# Patient Record
Sex: FEMALE | Race: WHITE
[De-identification: names, ages, dates, MRNs, and addresses within clinical notes are randomized per-mention and may not be internally consistent; named-entity substitution may affect disease eponyms.]

---

## 2017-07-30 NOTE — EDM.PDOC
ED HPI GENERAL MEDICAL PROBLEM





- General


Chief Complaint: Skin Complaint


Stated Complaint: PAIN/SORE BACK


Time Seen by Provider: 07/30/17 19:32





- History of Present Illness


INITIAL COMMENTS - FREE TEXT/NARRATIVE: 





HISTORY AND PHYSICAL:





History of present illness:


The patient is a 52-year-old female who follows at [facility] and presents 

with a history of having a like area of hardness on her back since the early 

2000 but over the last 1 week and has increased in size redness and discomfort. 

Patient states she had a bite there and it never seemed to quite heal and that 

was always a hardened area there but she has never had problems with it until 

the last 1 week. She went to [facility] and was seen there and given a 

prescription For Keflex which she did not want to fill because she felt that 

the area needs to be incised. She states that his increase in redness and size 

and she is worried. She has no systemic complaints of fever chills nausea 

vomiting or neurosensory changes and she has not noticed any drainage from the 

area. The patient tells me that she does not want to take the antibiotics 

because she feels it needs to be drained





Review of systems: 


As per history of present illness and below otherwise all systems reviewed and 

negative.





Past medical history: 


As per history of present illness and as reviewed below otherwise 

noncontributory.





Surgical history: 


As per history of present illness and as reviewed below otherwise 

noncontributory.





Social history: 


No reported history of drug or alcohol abuse.





Family history: 


As per history of present illness and as reviewed below otherwise 

noncontributory.





Physical exam:


General: Well-developed well-nourished female who is mildly overweight but 

nontoxic 


HEENT: Atraumatic, normocephalic, negative for conjunctival pallor or scleral 

icterus, mucous membranes moist, throat clear, neck supple, nontender, trachea 

midline.


Lungs: Clear to auscultation, breath sounds equal bilaterally, chest nontender.


Heart: S1S2, regular rate and rhythm no overt murmurs 


Abdomen: Soft, nondistended, nontender. NABS


Pelvis: Stable nontender.


Genitourinary: Deferred.


Rectal: Deferred.


Extremities: Atraumatic, negative for cords or calf pain. Neurovascular 

unremarkable.


Neuro: Awake, alert, oriented. Cranial nerves II through XII unremarkable. 

Cerebellum unremarkable. Motor and sensory unremarkable throughout. Exam 

nonfocal.


Skin: At the mid thoracic back to the left of the midline there is a 10 x 7 cm 

area of ill-defined erythema with a central area of induration and a small pore-

like punctum seen at its center. There is no fluctuance but there is tenderness 

on palpation. The margins are not very well demarcated for the area of 

induration. There is no drainage appreciated. Remainder of the back exam and 

skin exam is within normal limits 


Diagnostics:


CBC soft tissue ultrasound





Therapeutics:


Norco





The patient is been made aware of the delays with radiology reading her 

ultrasound and that I will address the results as soon as I receive them.





Patient is aware of small size of the fluid collection and due to the small 

nature of this we have decided not to try to IND it as I am not really sure of 

the depth of this and she has an appointment for evaluation for permanent 

excision this week. I will ask her to not to fill her prescription for Keflex 

that she was given from the clinic and I will prescribe her clindamycin. I will 

also give her something for pain management to take at home. She is comfortable 

with this clear plan





Impression: 


Back cyst/infected abscess/cellulitis





Definitive disposition and diagnosis as appropriate pending reevaluation and 

review of above.


  ** lump on lower back


Pain Score (Numeric/FACES): 9





- Related Data


 Allergies











Allergy/AdvReac Type Severity Reaction Status Date / Time


 


codeine Allergy  Vomiting Verified 07/30/17 19:34


 


Penicillins Allergy  Hives Verified 07/30/17 19:34


 


Sulfa (Sulfonamide Allergy  Rash Verified 07/30/17 19:34





Antibiotics)     


 


antibiotics Allergy  Other Uncoded 07/30/17 19:34











Home Meds: 


 Home Meds





. [No Known Home Meds]  07/30/17 [History]











ED ROS GENERAL





- Review of Systems


Review Of Systems: ROS reveals no pertinent complaints other than HPI.





ED EXAM, SKIN/RASH


Exam: See Below (See dictation)





Course





- Vital Signs


Last Recorded V/S: 


 Last Vital Signs











Temp  37.1 C   07/30/17 19:34


 


Pulse  107 H  07/30/17 21:42


 


Resp  14   07/30/17 21:42


 


BP  132/79   07/30/17 21:42


 


Pulse Ox  94 L  07/30/17 21:42














- Orders/Labs/Meds


Orders: 


 Active Orders 24 hr











 Category Date Time Status


 


 Abdomen Ltd [US] Stat Exams  07/30/17 19:42 Taken











Labs: 


 Laboratory Tests











  07/30/17 Range/Units





  20:29 


 


WBC  12.30 H  (4.0-11.0)  K/uL


 


RBC  4.32  (4.30-5.90)  M/uL


 


Hgb  14.2  (12.0-16.0)  g/dL


 


Hct  42.0  (36.0-46.0)  %


 


MCV  97.2  (80.0-98.0)  fL


 


MCH  32.9 H  (27.0-32.0)  pg


 


MCHC  33.8  (31.0-37.0)  g/dL


 


RDW Std Deviation  48.9  (28.0-62.0)  fl


 


RDW Coeff of Lindsey  14  (11.0-15.0)  %


 


Plt Count  274  (150-400)  K/uL


 


MPV  10.40  (7.40-12.00)  fL


 


Neut % (Auto)  75.0  (48.0-80.0)  %


 


Lymph % (Auto)  14.7 L  (16.0-40.0)  %


 


Mono % (Auto)  8.7  (0.0-15.0)  %


 


Eos % (Auto)  1.4  (0.0-7.0)  %


 


Baso % (Auto)  0.2  (0.0-1.5)  %


 


Neut # (Auto)  9.2 H  (1.4-5.7)  K/uL


 


Lymph # (Auto)  1.8  (0.6-2.4)  K/uL


 


Mono # (Auto)  1.1 H  (0.0-0.8)  K/uL


 


Eos # (Auto)  0.2  (0.0-0.7)  K/uL


 


Baso # (Auto)  0.0  (0.0-0.1)  K/uL


 


Nucleated RBC %  0.0  /100WBC


 


Nucleated RBCs #  0  K/uL











Meds: 


Medications














Discontinued Medications














Generic Name Dose Route Start Last Admin





  Trade Name Freq  PRN Reason Stop Dose Admin


 


Hydrocodone Bitart/Acetaminophen  1 tab  07/30/17 21:28  07/30/17 21:39





  Norco 325-7.5 Mg  PO  07/30/17 21:29  1 tab





  ONETIME ONE   Administration














Departure





- Departure


Time of Disposition: 21:46


Disposition: Home, Self-Care 01


Condition: Good


Clinical Impression: 


 Infected cyst of skin





Cellulitis


Qualifiers:


 Site of cellulitis: trunk Site of cellulitis of trunk: back Qualified Code(s): 

L03.312 - Cellulitis of back [any part except buttock]








- Discharge Information


Forms:  ED Department Discharge


Additional Instructions: 


The following information is given to patients seen in the emergency department 

who are being discharged to home. This information is to outline your options 

for follow-up care. We provide all patients seen in our emergency department 

with a follow-up referral.





The need for follow-up, as well as the timing and circumstances, are variable 

depending upon the specifics of your emergency department visit.





If you don't have a primary care physician on staff, we will provide you with a 

referral. We always advise you to contact your personal physician following an 

emergency department visit to inform them of the circumstance of the visit and 

for follow-up with them and/or the need for any referrals to a consulting 

specialist.





The emergency department will also refer you to a specialist when appropriate. 

This referral assures that you have the opportunity for followup care with a 

specialist. All of these measure are taken in an effort to provide you with 

optimal care, which includes your followup.





Under all circumstances we always encourage you to contact your private 

physician who remains a resource for coordinating  your care. When calling for 

followup care, please make the office aware that this follow-up is from your 

recent emergency room visit. If for any reason you are refused follow-up, 

please contact the [facility] emergency 

department at [phone] and ask to speak to the emergency department 

charge nurse.








[facility]


Specialty Care-General Surgery


20/[address] [address] [phone]





[facility] 


Specialty clinic-Plastic Surgery and Hand Surgery


[address] [address] [phone]





Please keep your appointment as scheduled in the general surgery clinic or call 

them or our plastic surgeon in the morning to see if you can get moved up. 

Please state that your patient in the ER and they should be aware of your 

visit. Please do not fill the prescription you have for antibiotics and please 

take the clindamycin you have been given tonight and use the pain medication 

you have been given here, Norco, only as needed. Use over-the-counter 

medications otherwise. Return to ER as needed and as discussed





- My Orders


Last 24 Hours: 


My Active Orders





07/30/17 19:42


Abdomen Ltd [US] Stat 














- Assessment/Plan


Last 24 Hours: 


My Active Orders





07/30/17 19:42


Abdomen Ltd [US] Stat

## 2017-07-31 NOTE — US
EXAM DATE: 17



PATIENT'S AGE: 52





Patient: CHANTALE ENGLAND



Facility: [place], [place]

Patient ID: [id number]

Site Patient ID: [id number].

Site Accession #: [id number]

: 1965

Study: US Abdomen [id number] 8:29:42 PM

Ordering Physician: Óscar Hatfield



Final Report: 

INDICATION: EVAL ABSCESS ON LT LOWER BACK



HISTORY:

Evaluate abscess.



COMPARISON:

None.



TECHNIQUE:

Limited ultrasound of the lower back.



FINDINGS:

There is skin thickening and edema, with a hypoechoic mass measuring 1.5 x 1.6 
x 2.1 cm. This has mild hyperemia peripherally, but no internal blood flow. 
Mixed echogenicity on grayscale imaging. This is compatible with the clinical 
suspicion of an abscess.



IMPRESSION:

1. Hypoechoic mass at the site of the palpable abnormality, consistent with an 
abscess.

2. Clinical and imaging followup is advised to document resolution.



Dictated by Dennis López MD @ 2017 9:36:39 PM





Dictated by: Dennis López MD @ 2017 21:36:47

(Electronic Signature)



Report Signed by Proxy.
[facility]

## 2017-08-01 NOTE — EDM.PDOC
ED HPI GENERAL MEDICAL PROBLEM





- General


Chief Complaint: Skin Complaint


Stated Complaint: PAIN CYST ON LOWER BACK


Time Seen by Provider: 08/01/17 15:33


Source of Information: Reports: Patient


History Limitations: Reports: No Limitations





- History of Present Illness


INITIAL COMMENTS - FREE TEXT/NARRATIVE: 


History of present illness:


[52-year-old female returns with concerns of increasing size of cyst/abscess in 

lower back. Patient became she does have a surgical consult pending but it has 

come to ahead and is boggy and doing some scant amount of drainage. Patient she 

has been breaking fevers and globally feeling unwell.]





Review of systems: 


As per history of present illness and below otherwise all systems reviewed and 

negative.





Past medical history: 


As per history of present illness and as reviewed below otherwise 

noncontributory.





Surgical history: 


As per history of present illness and as reviewed below otherwise 

noncontributory.





Social history: 


No reported history of drug or alcohol abuse.





Family history: 


As per history of present illness and as reviewed below otherwise 

noncontributory.





Physical exam:


HEENT: Atraumatic, normocephalic, pupils reactive, negative for conjunctival 

pallor or scleral icterus, mucous membranes moist, throat clear, neck supple, 

nontender, trachea midline.


Lungs: Clear to auscultation, breath sounds equal bilaterally, chest nontender.


Heart: S1S2, regular, negative for clicks, rubs, or JVD.


Abdomen: Soft, nondistended, nontender. Negative for masses or 

hepatosplenomegaly. Negative for costovertebral tenderness.


Pelvis: Stable nontender.


Genitourinary: Deferred.


Rectal: Deferred.


Extremities: Atraumatic, negative for cords or calf pain. Neurovascular 

unremarkable.


Neuro: Awake, alert, oriented. Cranial nerves II through XII unremarkable. 

Cerebellum unremarkable. Motor and sensory unremarkable throughout. Exam 

nonfocal.


Skin: Clearly delineated area of erythema with ronan pustules over the targeted 

area with some amount of bogginess.





Lower back cleaned with Betadine approximately 3 mL of 1% lidocaine injected 

until anesthesia was appreciated. An 11 blade used to make an incision 

approximately 1 inch with a scant amount of solid purulent discharge able to be 

expressed, and subsequent serosanguineous drainage coming freely. Discontinued 

further palpation and/or milking of the abscess secondary to level of pain 

patient was experiencing an inability to tolerate more. Patient does have a 

subsequent appointment for surgical consult but this did decrease the size and 

amount of purulence.





2 g Rocephin IM








Diagnostics:


[Culture]





Therapeutics:


[I&D]





Impression/Plan: 


[Abscess, cellulitis and metabolic care continue on clindamycin follow-up with 

surgery as already scheduled]








Definitive disposition and diagnosis as appropriate pending reevaluation and 

review of above.








  ** back


Pain Score (Numeric/FACES): 9





- Related Data


 Allergies











Allergy/AdvReac Type Severity Reaction Status Date / Time


 


codeine Allergy  Vomiting Verified 08/01/17 15:16


 


erythromycin base Allergy  Vomiting Verified 08/01/17 15:17


 


Penicillins Allergy  Hives Verified 08/01/17 15:16


 


Sulfa (Sulfonamide Allergy  Rash Verified 08/01/17 15:16





Antibiotics)     











Home Meds: 


 Home Meds





Acetaminophen/HYDROcodone [Norco 325-5 MG] 1 tab PO ASDIRECTED 08/01/17 [History

]


Clindamycin HCl 300 mg PO QID 08/01/17 [History]











Past Medical History





- Past Health History


Medical/Surgical History: Denies Medical/Surgical History


HEENT History: Reports: None


Cardiovascular History: Reports: None


Respiratory History: Reports: Asthma


Gastrointestinal History: Reports: None


Genitourinary History: Reports: None


Musculoskeletal History: Reports: None


Neurological History: Reports: None


Psychiatric History: Reports: None


Endocrine/Metabolic History: Reports: Other (See Below)


Other Endocrine/Metabolic History: gland removed





- Infectious Disease History


Infectious Disease History: Reports: Chicken Pox





Social & Family History





- Family History


Family Medical History: Noncontributory





- Tobacco Use


Smoking Status *Q: Current Some Day Smoker


Years of Tobacco use: 30


Packs/Tins Daily: 1





- Recreational Drug Use


Recreational Drug Use: No





ED ROS GENERAL





- Review of Systems


Review Of Systems: See Below (See history of present illness)





ED EXAM, SKIN/RASH


Exam: See Below (See history of present illness)





Course





- Vital Signs


Last Recorded V/S: 


 Last Vital Signs











Temp  36.6 C   08/01/17 15:17


 


Pulse  91   08/01/17 15:17


 


Resp  18   08/01/17 15:17


 


BP  124/75   08/01/17 15:17


 


Pulse Ox  95   08/01/17 15:17














- Orders/Labs/Meds


Meds: 


Medications














Discontinued Medications














Generic Name Dose Route Start Last Admin





  Trade Name Freq  PRN Reason Stop Dose Admin


 


Ceftriaxone Sodium 2,000 mg/  0 mg  08/01/17 16:34  





  Lidocaine HCl 2.1 ml  IM  08/01/17 16:35  





  ONETIME ONE   


 


Lidocaine HCl  20 ml  08/01/17 15:58  08/01/17 16:09





  Xylocaine 1%  INJECT  08/01/17 15:59  20 ml





  ONETIME ONE   Administration














Departure





- Departure


Time of Disposition: 16:39


Disposition: Home, Self-Care 01


Condition: Good


Clinical Impression: 


 Infected cyst of skin





Cellulitis


Qualifiers:


 Site of cellulitis: trunk Site of cellulitis of trunk: back Qualified Code(s): 

L03.312 - Cellulitis of back [any part except buttock]








- Discharge Information


Forms:  ED Department Discharge


Additional Instructions: 


The following information is given to patients seen in the emergency department 

who are being discharged to home. This information is to outline your options 

for follow-up care. We provide all patients seen in our emergency department 

with a follow-up referral.





The need for follow-up, as well as the timing and circumstances, are variable 

depending upon the specifics of your emergency department visit.





If you don't have a primary care physician on staff, we will provide you with a 

referral. We always advise you to contact your personal physician following an 

emergency department visit to inform them of the circumstance of the visit and 

for follow-up with them and/or the need for any referrals to a consulting 

specialist.





The emergency department will also refer you to a specialist when appropriate. 

This referral assures that you have the opportunity for follow-up care with a 

specialist. All of these measure are taken in an effort to provide you with 

optimal care, which includes your follow-up.





Under all circumstances we always encourage you to contact your private 

physician who remains a resource for coordinating your care. When calling for 

follow-up care, please make the office aware that this follow-up is from your 

recent emergency room visit. If for any reason you are refused follow-up, 

please contact the [facility] Emergency 

Department at [phone] and asked to speak to the emergency department 

charge nurse.





Take clindamycin and hydrocodone as Arty prescribed








Follow-up with your surgery appointment as discussed











Return to ED as needed as discussed

## 2017-08-04 NOTE — PCM.OPNOTE
- General Post-Op/Procedure Note


Date of Surgery/Procedure: 08/04/17


Operative Procedure(s): I & D left back abscess


Pre Op Diagnosis: Left back abscess


Post-Op Diagnosis: Same


Anesthesia Technique: General LMA (ASA II)


Primary Surgeon: Familia Muñoz


Fluid Replacement, Intraop: 750


EBL in mLs: 30


Condition: Good


Free Text/Narrative:: 





Dictation [id number]

## 2017-08-04 NOTE — OR
SURGEON:

Familia Muñoz M.D.

 

DATE OF PROCEDURE:  08/04/2017

 

OPERATION PERFORMED:

Incision and drainage of left back abscess.

 

ANESTHESIA:

General LMA.

 

ASA CLASSIFICATION:

II.

 

PREOPERATIVE DIAGNOSIS:

Chronic left back abscess.

 

POSTOPERATIVE DIAGNOSIS:

Chronic left back abscess.

 

ESTIMATED BLOOD LOSS:

Approximately 30 mL.

 

INTRAOPERATIVE FLUID REPLACEMENT:

750 mL of crystalloid.

 

DESCRIPTION OF PROCEDURE:

The patient was taken to the operating room, placed on the operating table in

the supine position with a beanbag under her.  Time-out was called for

appropriate identification of the patient and procedure.  Thigh-high TEDs and

sequential compression boots had been placed.  Following satisfactory attainment

of general anesthesia with placement of an LMA, she was positioned in the right

lateral decubitus position with left side elevated.  Care was taken to pad and

protect all bony prominences.  The surgical site in the left mid back was

prepped with Betadine solution.  Sterile drapes were applied.  The skin incision

was made directly over the mass and deepened through the subcutaneous tissue.

Aerobic and anaerobic cultures were obtained.  There was minimal amount of

purulent material.  There was a fair amount of debridement tissue and what

appeared to be sebaceous cyst wall.  This was all debrided away.  The wound was

irrigated with several 100 mL of sterile saline solution.  Hemostasis was

obtained with the use of electrocautery.  Half-inch Penrose drain felicia were

placed through the incision with a total of 3 felicia placed.  These were secured

with interrupted 2-0 nylon sutures to prevent them from migrating out too soon.

Once that was accomplished, field block was carried out with 10 mL of 0.5%

Marcaine.  The wound was then dressed with fluffs and taped in place with Mefix

tape.

 

The patient was then returned to the transfer cart in the supine position.

Following emergence from anesthesia and extubation, she was taken to recovery

room in stable condition.

 

 

HORACE DAWN

DD:  08/04/2017 14:09:49

DT:  08/04/2017 14:46:03

Job #:  [id number]

YAMINI

## 2017-08-04 NOTE — PCM.POSTAN
POST ANESTHESIA ASSESSMENT





- MENTAL STATUS


Mental Status: Alert, Oriented





- RESPIRATORY


Respiratory Status: Respiratory Rate WNL, Airway Patent, O2 Saturation Stable





- CARDIOVASCULAR


CV Status: Pulse Rate WNL, Blood Pressure Stable





- GASTROINTESTINAL


GI Status: No Symptoms





- PAIN


Pain Score: 0





- POST OP HYDRATION


Hydration Status: Adequate & Stable





- OBSERVATIONS


Free Text/Narrative:: 





no anesthesia problems

## 2017-08-04 NOTE — PCM.PREANE
Preanesthetic Assessment





- Anesthesia/Transfusion/Family Hx


Anesthesia History: Prior Anesthesia Without Reaction


Family History of Anesthesia Reaction: No


Transfusion History: No Prior Transfusion(s)


Intubation History: Unknown





- Review of Systems


General: No Symptoms


Pulmonary: No Symptoms


Cardiovascular: No Symptoms


Gastrointestinal: No Symptoms


Neurological: No Symptoms


Other: Reports: None





- Physical Assessment


O2 Sat by Pulse Oximetry: 98


Respiratory Rate: 16


Vital Signs: 





 Last Vital Signs











Temp  36.7 C   08/04/17 12:38


 


Pulse  85   08/04/17 12:38


 


Resp  16   08/04/17 12:38


 


BP  143/76 H  08/04/17 12:38


 


Pulse Ox  98   08/04/17 12:38











Height: 1.65 m


Weight: 102.058 kg


ASA Class: 2


Mental Status: Alert & Oriented x3


Airway Class: Mallampati = 2


Dentition: Reports: Normal Dentition


Thyro-Mental Finger Breadths: 3


Mouth Opening Finger Breadths: 3


ROM/Head Extension: Full


Lungs: Clear to Auscultation, Normal Respiratory Effort


Cardiovascular: Regular Rate, Regular Rhythm





- Allergies


Allergies/Adverse Reactions: 


 Allergies











Allergy/AdvReac Type Severity Reaction Status Date / Time


 


codeine Allergy  Vomiting Verified 08/01/17 15:16


 


erythromycin base Allergy  Vomiting Verified 08/01/17 15:17


 


Penicillins Allergy  Hives Verified 08/01/17 15:16


 


shellfish derived Allergy  Hives Verified 08/03/17 16:26


 


Sulfa (Sulfonamide Allergy  Rash Verified 08/01/17 15:16





Antibiotics)     














- Blood


Blood Available: No





- Anesthesia Plan


Pre-Op Medication Ordered: None





- Acknowledgements


Anesthesia Type Planned: General Anesthesia


Pt an Appropriate Candidate for the Planned Anesthesia: Yes


Alternatives and Risks of Anesthesia Discussed w Pt/Guardian: Yes


Pt/Guardian Understands and Agrees with Anesthesia Plan: Yes





PreAnesthesia Questionnaire





- Past Health History


Medical/Surgical History: Denies Medical/Surgical History


HEENT History: Reports: Allergic Rhinitis


Other HEENT History: uses reading glasses


Cardiovascular History: Reports: None


Respiratory History: Reports: Asthma


Other Respiratory History: mild asthma on exertion,  no inhaler


Gastrointestinal History: Reports: GERD


Other Gastrointestinal History: takes tums


Genitourinary History: Reports: None


OB/GYN History: Reports: None


Musculoskeletal History: Reports: Fracture


Other Musculoskeletal History: hx of fx toe


Neurological History: Reports: None


Psychiatric History: Reports: None


Endocrine/Metabolic History: Reports: Obesity/BMI 30+


Other Endocrine/Metabolic History: gland removed


Hematologic History: Reports: None


Immunologic History: Reports: None


Oncologic (Cancer) History: Reports: None


Dermatologic History: Reports: None





- Infectious Disease History


Infectious Disease History: Reports: Chicken Pox





- Past Surgical History


Head Surgeries/Procedures: Reports: None


HEENT Surgical History: Reports: Oral Surgery, Other (See Below)


Other HEENT Surgeries/Procedures: excision of wisdom teeth,  removal of 

infected lymph node right neck


Female  Surgical History: Reports: Tubal Ligation





- SUBSTANCE USE


Smoking Status *Q: Former Smoker (never serious smoker)


Tobacco Use Within Last Twelve Months: Cigarettes


Days Per Week of Alcohol Use: 7


Number of Drinks Per Day: 4


Total Drinks Per Week: 28


Recreational Drug Use History: No





- HOME MEDS


Home Medications: 


 Home Meds





Clindamycin HCl 300 mg PO TID 08/01/17 [History]











- CURRENT (IN HOUSE) MEDS


Current Meds: 





 Current Medications





Lactated Ringer's (Ringers, Lactated)  1,000 mls @ 125 mls/hr IV ASDIRECTED SE

## 2018-02-19 NOTE — EDM.PDOC
ED HPI GENERAL MEDICAL PROBLEM





- General


Chief Complaint: Abdominal Pain


Stated Complaint: ABDOMINAL PAIN


Time Seen by Provider: 02/19/18 15:22


Source of Information: Reports: Patient


History Limitations: Reports: No Limitations





- History of Present Illness


INITIAL COMMENTS - FREE TEXT/NARRATIVE: 


HISTORY AND PHYSICAL:





History of present illness:


Patient is a 52-year-old female who presents to the emergency room with 

complaints of subrapubic pelvic pain which is more localized in the left lower 

quadrant. She does state it radiates up into the left upper quadrant.  

states that she has had blood mixed in her stool since yesterday. Has had 2 

small hard bowel movements over the past 3-4 days. Upon arrival she is 

diaphoretic, clammy and hunched over in her wheelchair guarding her abdomen.





Denies any fever, chills, vomiting, diarrhea. Denies any near syncopy or 

syncopy. No history of GI illnesses or past surgeries.





Review of systems: 


As per history of present illness and below otherwise all systems reviewed and 

negative.





Past medical history: 


As per history of present illness and as reviewed below otherwise 

noncontributory.





Surgical history: 


As per history of present illness and as reviewed below otherwise 

noncontributory.





Social history: 


No reported history of drug or alcohol abuse.





Family history: 


As per history of present illness and as reviewed below otherwise 

noncontributory.





Physical exam:


General: Well-developed and well-nourished 52-year-old female. Alert and 

oriented. Nontoxic appearing.


HEENT: Atraumatic, normocephalic, pupils reactive, negative for conjunctival 

pallor or scleral icterus, mucous membranes moist, throat clear, neck supple, 

nontender, trachea midline.


Lungs: Clear to auscultation, breath sounds equal bilaterally, chest nontender.


Heart: S1S2, regular, negative for clicks, rubs, or JVD.


Abdomen: Soft, nondistended, firm with tenderness to the left upper quadrant 

and left lower quadrant. Negative for masses or hepatosplenomegaly. Negative 

for costovertebral tenderness.


Pelvis: Stable nontender.


Genitourinary: Deferred.


Rectal: This was done with a chaperone at the bedside. Hemoccult is


Skin: Diaphoretic, intact, no overt lesions or rashes.


Extremities: Atraumatic, moves all extremities per self without difficulty or 

deficits, negative for cords or calf pain. Neurovascular unremarkable.


Neuro: Awake, alert, oriented. Cranial nerves II through XII unremarkable. 

Cerebellum unremarkable. Motor and sensory unremarkable throughout. Exam 

nonfocal.





Afebrile. VSS. WBC 14.46. Negative Amylase/Lipase. Negative UA.


1524 - the radiologist called with a verbal report for this patient's CT. Shows 

free air adjacent to the distal sigmoid colon/ rectum, tracking along the 

presacral space. Probable adjacent abscess formation with fecal material 

communicating with the presumed site of perforation. This information was 

shared with Dr. Mahoney, who was consulted on this case. Levaquin (due to PCN 

allergy) and Flagyl has been started.





Dr. Mahoney has come to evaluate patient. She is initiated transferred to 

[facility] and [facility] for higher level of care. Assumed care of patient at this 

time.





Diagnostics:


CBC, CMP, amylase, lipase, CT abdomen and pelvis, UA





Therapeutics:


Normal saline, Toradol, Zofran, morphine, Levaquin, Flagyl





Impression: 


Abdominal pain


Perforated colon with abscess





Plan:


Transfer to [facility] in [place] per Dr Mahoney





Definitive disposition and diagnosis as appropriate pending reevaluation and 

review of above.





Onset: Today


Duration: Minutes:


  ** Abdominal


Pain Score (Numeric/FACES): 10





- Related Data


 Allergies











Allergy/AdvReac Type Severity Reaction Status Date / Time


 


codeine Allergy  Vomiting Verified 02/19/18 15:27


 


erythromycin base Allergy  Vomiting Verified 02/19/18 15:27


 


Penicillins Allergy  Hives Verified 02/19/18 15:27


 


shellfish derived Allergy  Hives Verified 02/19/18 15:27


 


Sulfa (Sulfonamide Allergy  Rash Verified 02/19/18 15:27





Antibiotics)     











Home Meds: 


 Home Meds





. [No Known Home Meds]  02/19/18 [History]











Past Medical History





- Past Health History


Medical/Surgical History: Denies Medical/Surgical History


HEENT History: Reports: Allergic Rhinitis


Other HEENT History: uses reading glasses


Cardiovascular History: Reports: None


Respiratory History: Reports: Asthma


Other Respiratory History: mild asthma on exertion,  no inhaler


Gastrointestinal History: Reports: GERD


Other Gastrointestinal History: takes tums


Genitourinary History: Reports: None


OB/GYN History: Reports: None


Musculoskeletal History: Reports: Fracture


Other Musculoskeletal History: hx of fx toe


Neurological History: Reports: None


Psychiatric History: Reports: None


Endocrine/Metabolic History: Reports: Obesity/BMI 30+


Other Endocrine/Metabolic History: gland removed


Hematologic History: Reports: None


Immunologic History: Reports: None


Oncologic (Cancer) History: Reports: None


Dermatologic History: Reports: None





- Infectious Disease History


Infectious Disease History: Reports: Chicken Pox





- Past Surgical History


Head Surgeries/Procedures: Reports: None


HEENT Surgical History: Reports: Oral Surgery, Other (See Below)


Other HEENT Surgeries/Procedures: excision of wisdom teeth,  removal of 

infected lymph node right neck


Female  Surgical History: Reports: Tubal Ligation





Social & Family History





- Family History


Family Medical History: Noncontributory





- Tobacco Use


Smoking Status *Q: Never Smoker


Years of Tobacco use: 20


Packs/Tins Daily: 1


Used Tobacco, but Quit: Yes


Second Hand Smoke Exposure: No





- Caffeine Use


Caffeine Use: Reports: None





- Alcohol Use


Days Per Week of Alcohol Use: 7


Number of Drinks Per Day: 4


Total Drinks Per Week: 28





- Recreational Drug Use


Recreational Drug Use: No


Drug Use in Last 12 Months: No





ED ROS GENERAL





- Review of Systems


Review Of Systems: ROS reveals no pertinent complaints other than HPI.





ED EXAM, GI/ABD





- Physical Exam


Exam: See Below (See dictation)





Course





- Vital Signs


Last Recorded V/S: 


 Last Vital Signs











Temp  97.3 F   02/19/18 17:30


 


Pulse  78   02/19/18 17:30


 


Resp  18   02/19/18 17:30


 


BP  129/81   02/19/18 17:30


 


Pulse Ox  96   02/19/18 17:30














- Orders/Labs/Meds


Orders: 


 Active Orders 24 hr











 Category Date Time Status


 


 Abdomen Pelvis w Cont [CT] Stat Exams  02/19/18 15:39 Taken


 


 Levofloxacin/Dextrose 5%-Water [Levaquin in D5W 750 MG/ Med  02/19/18 17:31 

Active





 150 ML] 750 mg   





 Premix Bag 1 bag   





 IV ONETIME   


 


 metroNIDAZOLE/Normal Saline [Flagyl 500 MG in  ML Med  02/19/18 17:30 

Active





 ] 500 mg   





 Premix Bag 1 bag   





 IV ONETIME   








 Medication Orders





Metronidazole 500 mg/ Premix  100 mls @ 100 mls/hr IV ONETIME ONE


   Stop: 02/19/18 18:29


   Last Admin: 02/19/18 17:35  Dose: 100 mls/hr


Levofloxacin/Dextrose 750 mg/ (Premix)  150 mls @ 100 mls/hr IV ONETIME ONE


   Stop: 02/19/18 19:00








Labs: 


 Laboratory Tests











  02/19/18 02/19/18 02/19/18 Range/Units





  15:33 15:33 15:33 


 


WBC  14.46 H    (4.0-11.0)  K/uL


 


RBC  4.58    (4.30-5.90)  M/uL


 


Hgb  15.1    (12.0-16.0)  g/dL


 


Hct  44.2    (36.0-46.0)  %


 


MCV  96.5    (80.0-98.0)  fL


 


MCH  33.0 H    (27.0-32.0)  pg


 


MCHC  34.2    (31.0-37.0)  g/dL


 


RDW Std Deviation  48.3    (28.0-62.0)  fl


 


RDW Coeff of Lindsey  14    (11.0-15.0)  %


 


Plt Count  322    (150-400)  K/uL


 


MPV  10.30    (7.40-12.00)  fL


 


Neut % (Auto)  74.3    (48.0-80.0)  %


 


Lymph % (Auto)  17.0    (16.0-40.0)  %


 


Mono % (Auto)  6.4    (0.0-15.0)  %


 


Eos % (Auto)  1.9    (0.0-7.0)  %


 


Baso % (Auto)  0.4    (0.0-1.5)  %


 


Neut # (Auto)  10.7 H    (1.4-5.7)  K/uL


 


Lymph # (Auto)  2.5 H    (0.6-2.4)  K/uL


 


Mono # (Auto)  0.9 H    (0.0-0.8)  K/uL


 


Eos # (Auto)  0.3    (0.0-0.7)  K/uL


 


Baso # (Auto)  0.1    (0.0-0.1)  K/uL


 


Nucleated RBC %  0.0    /100WBC


 


Nucleated RBCs #  0    K/uL


 


Sodium   141   (136-146)  mmol/L


 


Potassium   4.2   (3.5-5.1)  mmol/L


 


Chloride   107   ()  mmol/L


 


Carbon Dioxide   24   (21-31)  mmol/L


 


BUN   11   (6.0-23.0)  mg/dL


 


Creatinine   1.1   (0.6-1.5)  mg/dL


 


Est Cr Clr Drug Dosing   51.66   mL/min


 


Estimated GFR (MDRD)   52.2   ml/min


 


Glucose   117 H   ()  mg/dL


 


Calcium   8.9   (8.8-10.8)  mg/dL


 


Total Bilirubin   0.3   (0.1-1.5)  mg/dL


 


AST   30   (5-40)  IU/L


 


ALT   39   (8-54)  IU/L


 


Alkaline Phosphatase   62   ()  


 


Total Protein   7.7   (6.0-8.0)  g/dL


 


Albumin   4.1   (3.5-5.0)  g/dL


 


Globulin   3.6 H   (2.0-3.5)  g/dL


 


Albumin/Globulin Ratio   1.1 L   (1.3-2.8)  


 


Amylase    62  (10-90)  U/L


 


Lipase    16  (7-80)  U/L


 


Urine Color     


 


Urine Appearance     


 


Urine pH     (5.0-8.0)  


 


Ur Specific Gravity     (1.001-1.035)  


 


Urine Protein     (NEGATIVE)  mg/dL


 


Urine Glucose (UA)     (NEGATIVE)  mg/dL


 


Urine Ketones     (NEGATIVE)  mg/dL


 


Urine Occult Blood     (NEGATIVE)  


 


Urine Nitrite     (NEGATIVE)  


 


Urine Bilirubin     (NEGATIVE)  


 


Urine Urobilinogen     (<2.0)  EU/dL


 


Ur Leukocyte Esterase     (NEGATIVE)  


 


Urine RBC     (0-2/HPF)  


 


Urine WBC     (0-5/HPF)  


 


Ur Epithelial Cells     (NONE-FEW)  


 


Urine Bacteria     (NEGATIVE)  














  02/19/18 Range/Units





  16:51 


 


WBC   (4.0-11.0)  K/uL


 


RBC   (4.30-5.90)  M/uL


 


Hgb   (12.0-16.0)  g/dL


 


Hct   (36.0-46.0)  %


 


MCV   (80.0-98.0)  fL


 


MCH   (27.0-32.0)  pg


 


MCHC   (31.0-37.0)  g/dL


 


RDW Std Deviation   (28.0-62.0)  fl


 


RDW Coeff of Lindsey   (11.0-15.0)  %


 


Plt Count   (150-400)  K/uL


 


MPV   (7.40-12.00)  fL


 


Neut % (Auto)   (48.0-80.0)  %


 


Lymph % (Auto)   (16.0-40.0)  %


 


Mono % (Auto)   (0.0-15.0)  %


 


Eos % (Auto)   (0.0-7.0)  %


 


Baso % (Auto)   (0.0-1.5)  %


 


Neut # (Auto)   (1.4-5.7)  K/uL


 


Lymph # (Auto)   (0.6-2.4)  K/uL


 


Mono # (Auto)   (0.0-0.8)  K/uL


 


Eos # (Auto)   (0.0-0.7)  K/uL


 


Baso # (Auto)   (0.0-0.1)  K/uL


 


Nucleated RBC %   /100WBC


 


Nucleated RBCs #   K/uL


 


Sodium   (136-146)  mmol/L


 


Potassium   (3.5-5.1)  mmol/L


 


Chloride   ()  mmol/L


 


Carbon Dioxide   (21-31)  mmol/L


 


BUN   (6.0-23.0)  mg/dL


 


Creatinine   (0.6-1.5)  mg/dL


 


Est Cr Clr Drug Dosing   mL/min


 


Estimated GFR (MDRD)   ml/min


 


Glucose   ()  mg/dL


 


Calcium   (8.8-10.8)  mg/dL


 


Total Bilirubin   (0.1-1.5)  mg/dL


 


AST   (5-40)  IU/L


 


ALT   (8-54)  IU/L


 


Alkaline Phosphatase   ()  


 


Total Protein   (6.0-8.0)  g/dL


 


Albumin   (3.5-5.0)  g/dL


 


Globulin   (2.0-3.5)  g/dL


 


Albumin/Globulin Ratio   (1.3-2.8)  


 


Amylase   (10-90)  U/L


 


Lipase   (7-80)  U/L


 


Urine Color  YELLOW  


 


Urine Appearance  CLEAR  


 


Urine pH  5.0  (5.0-8.0)  


 


Ur Specific Gravity  1.015  (1.001-1.035)  


 


Urine Protein  NEGATIVE  (NEGATIVE)  mg/dL


 


Urine Glucose (UA)  NEGATIVE  (NEGATIVE)  mg/dL


 


Urine Ketones  NEGATIVE  (NEGATIVE)  mg/dL


 


Urine Occult Blood  NEGATIVE  (NEGATIVE)  


 


Urine Nitrite  NEGATIVE  (NEGATIVE)  


 


Urine Bilirubin  NEGATIVE  (NEGATIVE)  


 


Urine Urobilinogen  0.2  (<2.0)  EU/dL


 


Ur Leukocyte Esterase  NEGATIVE  (NEGATIVE)  


 


Urine RBC  0-1  (0-2/HPF)  


 


Urine WBC  0-1  (0-5/HPF)  


 


Ur Epithelial Cells  OCCASIONAL  (NONE-FEW)  


 


Urine Bacteria  RARE  (NEGATIVE)  











Meds: 


Medications











Generic Name Dose Route Start Last Admin





  Trade Name Gabby  PRN Reason Stop Dose Admin


 


Metronidazole 500 mg/ Premix  100 mls @ 100 mls/hr  02/19/18 17:30  02/19/18 17:

35





  IV  02/19/18 18:29  100 mls/hr





  ONETIME ONE   Administration


 


Levofloxacin/Dextrose 750 mg/  150 mls @ 100 mls/hr  02/19/18 17:31  





  Premix  IV  02/19/18 19:00  





  ONETIME ONE   














Discontinued Medications














Generic Name Dose Route Start Last Admin





  Trade Name Gabby  PRN Reason Stop Dose Admin


 


Sodium Chloride  1,000 mls @ 999 mls/hr  02/19/18 15:39  02/19/18 15:49





  Normal Saline  IV  02/19/18 16:39  999 mls/hr





  STAT ONE   Administration


 


Iopamidol  100 ml  02/19/18 17:56  02/19/18 17:57





  Isovue Multipack-370 (76%)  IVPUSH  02/19/18 17:57  100 ml





  ONETIME STA   Administration


 


Ketorolac Tromethamine  30 mg  02/19/18 15:40  02/19/18 15:48





  Toradol  IVPUSH  02/19/18 15:41  30 mg





  ONETIME ONE   Administration


 


Morphine Sulfate  4 mg  02/19/18 15:55  02/19/18 16:02





  Morphine  IVPUSH  02/19/18 15:56  4 mg





  ONETIME ONE   Administration


 


Morphine Sulfate  4 mg  02/19/18 17:55  





  Morphine  IVPUSH  02/19/18 17:56  





  ONETIME ONE   


 


Ondansetron HCl  4 mg  02/19/18 15:39  02/19/18 15:49





  Zofran  IVPUSH  02/19/18 15:40  4 mg





  ONETIME ONE   Administration














Departure





- Departure


Time of Disposition: 18:01


Disposition: DC/Tfer to Other 70


Clinical Impression: 


 Perforated sigmoid colon





Abdominal pain


Qualifiers:


 Abdominal location: left lower quadrant Qualified Code(s): R10.32 - Left lower 

quadrant pain








- Discharge Information


Referrals: 


Boni Cortés MD [Primary Care Provider] - 


Forms:  ED Department Discharge





- My Orders


Last 24 Hours: 


My Active Orders





02/19/18 15:39


Abdomen Pelvis w Cont [CT] Stat 





02/19/18 17:30


metroNIDAZOLE/Normal Saline [Flagyl 500 MG in  ML] 500 mg   Premix Bag 1 

bag IV ONETIME 





02/19/18 17:31


Levofloxacin/Dextrose 5%-Water [Levaquin in D5W 750 MG/150 ML] 750 mg   Premix 

Bag 1 bag IV ONETIME 














- Assessment/Plan


Last 24 Hours: 


My Active Orders





02/19/18 15:39


Abdomen Pelvis w Cont [CT] Stat 





02/19/18 17:30


metroNIDAZOLE/Normal Saline [Flagyl 500 MG in  ML] 500 mg   Premix Bag 1 

bag IV ONETIME 





02/19/18 17:31


Levofloxacin/Dextrose 5%-Water [Levaquin in D5W 750 MG/150 ML] 750 mg   Premix 

Bag 1 bag IV ONETIME

## 2018-02-20 NOTE — CT
LYNDSAY.EXAM DATE: 18



PATIENT'S AGE: 52



Patient: CHANTALE ENGLAND



Facility: [place], [place]

Patient ID: [id number]

Site Patient ID: [id number].

Site Accession #: [id number].

: 1965

Study: CT Abdomen/Pelvis [id number]/19/2018 4:45:24 PM

Ordering Physician: Doctor Temp



Final Report: 

INDICATION: abd pain, bloody stools



TECHNIQUE:

CT abdomen and pelvis acquired with IV contrast.



COMPARISON:

None 



FINDINGS:

Lower chest: Unremarkable. 

Liver: Diffuse fatty infiltration of the liver 

Spleen: Unremarkable. 

Pancreas: Unremarkable. 

Gallbladder and bile ducts: Unremarkable. 

Kidneys: Unremarkable. 

Adrenal glands: Unremarkable. 

GI tract: Free air adjacent to the distal sigmoid colon/ rectum tracking along 
the presacral space. Probable adjacent abscess formation with fecal material 
communicating with the presumed site of perforation best appreciated on series 
201 image 120. Hiatal hernia Appendix is normal. 

Vascular structures: Negative. No sign of aneurysm. 

Lymph nodes: Unremarkable. 

Miscellaneous: Unremarkable. No free air or significant free fluid. 

Pelvic Organs: Unremarkable. 

Bones: Degenerative changes. 



IMPRESSION:

Free air adjacent to the distal sigmoid colon/ rectum, tracking along the 
presacral space. Probable adjacent abscess formation with fecal material 
communicating with the presumed site of perforation best appreciated on series 
201 image 120.. 

Results called to Dr. Koehler at 1725 hours on 2018



Dictated by Cruzito Bishop MD @ 2018 5:27:06 PM



Dictated by: Cruzito Bishop MD @ 2018 17:27:14

(Electronic Signature)



Report Signed by Proxy.
YAMINI

## 2018-04-14 NOTE — EDM.PDOC
ED HPI GENERAL MEDICAL PROBLEM





- General


Chief Complaint: Wound Recheck


Stated Complaint: UNK


Time Seen by Provider: 04/14/18 21:51





- History of Present Illness


INITIAL COMMENTS - FREE TEXT/NARRATIVE: 


Patient 53-year-old female who requests wound VAC dressing change and/or 

referral she defers any diagnostics or evaluation is looking just for wound VAC 

dressing change due to presumptive malfunctioning after her and her  

have been through this with the  and have determined that this 

needs to be changed I discussed with her that our availability of wound care 

nurses tonight and tomorrow is nonexistent and that we would be glad to 

evaluate remove the wound wet-to-dry dressing change contact her physician for 

guidance at this point patient declines any further evaluation or treatment and 

requested discharge so she can drive to [place] for definitive care. Patient 

will be discharged with diagnosis of #1 wound VAC malfunction








- Related Data


 Allergies











Allergy/AdvReac Type Severity Reaction Status Date / Time


 


codeine Allergy  Vomiting Verified 02/19/18 15:27


 


erythromycin base Allergy  Vomiting Verified 02/19/18 15:27


 


Penicillins Allergy  Hives Verified 02/19/18 15:27


 


shellfish derived Allergy  Hives Verified 02/19/18 15:27


 


Sulfa (Sulfonamide Allergy  Rash Verified 02/19/18 15:27





Antibiotics)     











Home Meds: 


 Home Meds





. [No Known Home Meds]  02/19/18 [History]











Past Medical History





- Past Health History


Medical/Surgical History: Denies Medical/Surgical History


HEENT History: Reports: Allergic Rhinitis


Other HEENT History: uses reading glasses


Cardiovascular History: Reports: None


Respiratory History: Reports: Asthma


Other Respiratory History: mild asthma on exertion,  no inhaler


Gastrointestinal History: Reports: GERD


Other Gastrointestinal History: takes tums


Genitourinary History: Reports: None


OB/GYN History: Reports: None


Musculoskeletal History: Reports: Fracture


Other Musculoskeletal History: hx of fx toe


Neurological History: Reports: None


Psychiatric History: Reports: None


Endocrine/Metabolic History: Reports: Obesity/BMI 30+


Other Endocrine/Metabolic History: gland removed


Hematologic History: Reports: None


Immunologic History: Reports: None


Oncologic (Cancer) History: Reports: None


Dermatologic History: Reports: None





- Infectious Disease History


Infectious Disease History: Reports: Chicken Pox





- Past Surgical History


Head Surgeries/Procedures: Reports: None


HEENT Surgical History: Reports: Oral Surgery, Other (See Below)


Other HEENT Surgeries/Procedures: excision of wisdom teeth,  removal of 

infected lymph node right neck


Female  Surgical History: Reports: Tubal Ligation





Social & Family History





- Family History


Family Medical History: Noncontributory





- Tobacco Use


Smoking Status *Q: Never Smoker


Years of Tobacco use: 20


Packs/Tins Daily: 1


Used Tobacco, but Quit: Yes


Second Hand Smoke Exposure: No





- Caffeine Use


Caffeine Use: Reports: None





- Alcohol Use


Days Per Week of Alcohol Use: 7


Number of Drinks Per Day: 4


Total Drinks Per Week: 28





- Recreational Drug Use


Recreational Drug Use: No


Drug Use in Last 12 Months: No





ED ROS GENERAL





- Review of Systems


Review Of Systems: ROS reveals no pertinent complaints other than HPI.





ED EXAM, GENERAL





- Physical Exam


Exam: See Below (See dictation)





Departure





- Departure


Time of Disposition: 22:18


Disposition: Home, Self-Care 01


Condition: Good


Clinical Impression: 


 Encounter for medical screening examination








- Discharge Information


Referrals: 


Boni Cortés MD [Primary Care Provider] - 


Additional Instructions: 


The following information is given to patients seen in the emergency department 

who are being discharged to home. This information is to outline your options 

for follow-up care. We provide all patients seen in our emergency department 

with a follow-up referral.





The need for follow-up, as well as the timing and circumstances, are variable 

depending upon the specifics of your emergency department visit.





If you don't have a primary care physician on staff, we will provide you with a 

referral. We always advise you to contact your personal physician following an 

emergency department visit to inform them of the circumstance of the visit and 

for follow-up with them and/or the need for any referrals to a consulting 

specialist.





The emergency department will also refer you to a specialist when appropriate. 

This referral assures that you have the opportunity for followup care with a 

specialist. All of these measure are taken in an effort to provide you with 

optimal care, which includes your followup.





Under all circumstances we always encourage you to contact your private 

physician who remains a resource for coordinating  your care. When calling for 

followup care, please make the office aware that this follow-up is from your 

recent emergency room visit. If for any reason you are refused follow-up, 

please contact the [facility] emergency department at [phone] 

and asked to speak to the emergency department charge nurse.














Proceed to [facility] as discussed return as needed as discussed

## 2021-06-02 ENCOUNTER — HOSPITAL ENCOUNTER (EMERGENCY)
Dept: HOSPITAL 56 - MW.ED | Age: 56
Discharge: HOME | End: 2021-06-02
Payer: COMMERCIAL

## 2021-06-02 VITALS — SYSTOLIC BLOOD PRESSURE: 104 MMHG | HEART RATE: 81 BPM | DIASTOLIC BLOOD PRESSURE: 68 MMHG

## 2021-06-02 DIAGNOSIS — Z88.2: ICD-10-CM

## 2021-06-02 DIAGNOSIS — K29.70: ICD-10-CM

## 2021-06-02 DIAGNOSIS — Z91.013: ICD-10-CM

## 2021-06-02 DIAGNOSIS — N39.0: Primary | ICD-10-CM

## 2021-06-02 DIAGNOSIS — Z88.1: ICD-10-CM

## 2021-06-02 DIAGNOSIS — E87.1: ICD-10-CM

## 2021-06-02 DIAGNOSIS — E86.0: ICD-10-CM

## 2021-06-02 DIAGNOSIS — Z88.5: ICD-10-CM

## 2021-06-02 LAB
BUN SERPL-MCNC: 13 MG/DL (ref 7–18)
CHLORIDE SERPL-SCNC: 93 MMOL/L (ref 98–107)
CO2 SERPL-SCNC: 27.9 MMOL/L (ref 21–32)
GLUCOSE SERPL-MCNC: 114 MG/DL (ref 74–106)
LIPASE SERPL-CCNC: 45 U/L (ref 73–393)
POTASSIUM SERPL-SCNC: 4.4 MMOL/L (ref 3.5–5.1)
SODIUM SERPL-SCNC: 130 MMOL/L (ref 136–145)

## 2021-06-02 PROCEDURE — 83690 ASSAY OF LIPASE: CPT

## 2021-06-02 PROCEDURE — 85025 COMPLETE CBC W/AUTO DIFF WBC: CPT

## 2021-06-02 PROCEDURE — 96375 TX/PRO/DX INJ NEW DRUG ADDON: CPT

## 2021-06-02 PROCEDURE — 87186 SC STD MICRODIL/AGAR DIL: CPT

## 2021-06-02 PROCEDURE — 36415 COLL VENOUS BLD VENIPUNCTURE: CPT

## 2021-06-02 PROCEDURE — 96374 THER/PROPH/DIAG INJ IV PUSH: CPT

## 2021-06-02 PROCEDURE — 87086 URINE CULTURE/COLONY COUNT: CPT

## 2021-06-02 PROCEDURE — 99284 EMERGENCY DEPT VISIT MOD MDM: CPT

## 2021-06-02 PROCEDURE — 81001 URINALYSIS AUTO W/SCOPE: CPT

## 2021-06-02 PROCEDURE — C9113 INJ PANTOPRAZOLE SODIUM, VIA: HCPCS

## 2021-06-02 PROCEDURE — 80053 COMPREHEN METABOLIC PANEL: CPT

## 2021-06-02 NOTE — EDM.PDOC
ED HPI GENERAL MEDICAL PROBLEM





- General


Chief Complaint: General


Stated Complaint: dehydrated


Time Seen by Provider: 06/02/21 12:33





- History of Present Illness


INITIAL COMMENTS - FREE TEXT/NARRATIVE: 





History of present illness:


[] This patient complains that she is dehydrated.  When questioned at first she 

doesn't understand why asked about symptoms but her symptoms include 

lightheadedness, orthostatic lightheadedness, dry mouth, and decreased urine 

output.  She has drunk a hold of a large glass of water today and kept it down. 

 She vomited yesterday.  She has diminished stool in her ostomy bag.  The 

patient has pain in the epigastrium that is moderate.











The patient has a history of partial colectomy in 2018 for colon cancer.  She 

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 

from both areas.  She urinates to her normal bladder and urethra.  Patient has 

diminished stool only a tiny bit of liquid formed this morning in her pouch.





Review of systems: 


As per history of present illness and below otherwise all systems reviewed and 

negative.





Past medical history: 


As per history of present illness and as reviewed below otherwise 

noncontributory.





Surgical history: 


As per history of present illness and as reviewed below otherwise 

noncontributory.





Social history: 


No reported history of drug or alcohol abuse.





Family history: 


As per history of present illness and as reviewed below otherwise 

noncontributory.





Physical exam:


Constitutional - well developed, well-nourished and in no acute distress


HEENT - normocephalic, no evidence of trauma - external nose and mouth normal - 

no mass in neck and no JVD - mucosae moist





EYES - full EOM, PERRL, no icterus - no evidence of inflammation, injection, or 

drainage





Respiratory - no respiratory distress, equal bilateral expansion, lungs clear to

 auscultation and no abnormal lung sounds





Cardiovascular - Regular Rhythm with S1 and S2 appreciated and no murmur, gallop

 or rub.





GI -ostomy pellets on the right lower anterior abdomen.  Tenderness in the 

epigastrium.  Abdomen soft without distension or organomegaly - normal bowel 

sounds - no guard or rebound





Musculoskeletal  no gross deformity of long bones or joints - no tenderness, 

swelling or edema





Neurologic - Alert and oriented times four - CN II-XII grossly intact - motor 

sensory and coordination symmetrically normal





Psychiatric - appropriate mood and affect with normal thought content





Hematologic - No petechiae or purpura - mucosa appropriate color and sclera not 

pale - normal nail bed color and refill





Integument - no rash or evidence of trauma - normal turgor





Diagnostics:


[]





Therapeutics:


[]





Impression: 


[]





Plan:


[]





Definitive disposition and diagnosis as appropriate pending reevaluation and 

review of above.








  ** Abdominal Pain


Pain Score (Numeric/FACES): 8





- Related Data


                                    Allergies











Allergy/AdvReac Type Severity Reaction Status Date / Time


 


codeine Allergy  Vomiting Verified 06/02/21 12:57


 


erythromycin base Allergy  Vomiting Verified 06/02/21 12:57


 


Penicillins Allergy  Hives Verified 06/02/21 12:57


 


shellfish derived Allergy  Hives Verified 06/02/21 12:57


 


Sulfa (Sulfonamide Allergy  Rash Verified 06/02/21 12:57





Antibiotics)     











Home Meds: 


                                    Home Meds





Nitrofurantoin Monohyd/M-Cryst [Macrobid 100 mg Capsule] 100 mg PO BID #20 cap

odessa 06/02/21 [Rx]


Ondansetron [Zofran ODT] 4 mg PO Q6H PRN #10 tab.dis 06/02/21 [Rx]


Pantoprazole [ProTONIX***] 40 mg PO DAILY #30 tab.cr 06/02/21 [Rx]











Past Medical History





- Past Health History


Medical/Surgical History: Denies Medical/Surgical History


HEENT History: Reports: Allergic Rhinitis


Other HEENT History: uses reading glasses


Cardiovascular History: Reports: None


Respiratory History: Reports: Asthma


Other Respiratory History: mild asthma on exertion,  no inhaler


Gastrointestinal History: Reports: GERD


Other Gastrointestinal History: takes tums


Genitourinary History: Reports: None


OB/GYN History: Reports: None


Musculoskeletal History: Reports: Fracture


Other Musculoskeletal History: hx of fx toe


Neurological History: Reports: None


Psychiatric History: Reports: None


Endocrine/Metabolic History: Reports: Obesity/BMI 30+


Other Endocrine/Metabolic History: gland removed


Hematologic History: Reports: None


Immunologic History: Reports: None


Oncologic (Cancer) History: Reports: None


Dermatologic History: Reports: None





- Infectious Disease History


Infectious Disease History: Reports: Chicken Pox





- Past Surgical History


Head Surgeries/Procedures: Reports: None


HEENT Surgical History: Reports: Oral Surgery, Other (See Below)


Other HEENT Surgeries/Procedures: excision of wisdom teeth,  removal of infected

 lymph node right neck


Female  Surgical History: Reports: Tubal Ligation





Social & Family History





- Family History


Family Medical History: No Pertinent Family History





- Caffeine Use


Caffeine Use: Reports: None





ED ROS GENERAL





- Review of Systems


Review Of Systems: Comprehensive ROS is negative, except as noted in HPI.





ED EXAM, GENERAL





- Physical Exam


Exam: See Below


Free Text/Narrative:: 





My physical exam is in the HPI





Course





- Vital Signs


Text/Narrative:: 





1428 hrs. patient still has a little epigastric pain.  She feels somewhat 

better.  I will give her another liter and she tells me she used to take 

pantoprazole for stomach acid but recently she has been taking vinegar twice a 

day.


Last Recorded V/S: 


                                Last Vital Signs











Temp  36.6 C   06/02/21 14:44


 


Pulse  91   06/02/21 14:44


 


Resp  18   06/02/21 14:44


 


BP  120/68   06/02/21 14:44


 


Pulse Ox  96   06/02/21 14:44














- Orders/Labs/Meds


Orders: 


                               Active Orders 24 hr











 Category Date Time Status


 


 CULTURE URINE [MREF] Stat Lab  06/02/21 14:43 Ordered


 


 Sodium Chloride 0.9% [Normal Saline] 1,000 ml Med  06/02/21 14:28 Active





 IV .Bolus   


 


 Sodium Chloride 0.9% [Saline Flush] Med  06/02/21 12:54 Active





 10 ml FLUSH ASDIRECTED PRN   


 


 Sodium Chloride 0.9% [Saline Flush] Med  06/02/21 12:54 Active





 2.5 ml FLUSH ASDIRECTED PRN   


 


 Saline Lock Insert [OM.PC] Stat Oth  06/02/21 12:54 Ordered








                                Medication Orders





Sodium Chloride (Normal Saline)  1,000 mls @ 1,000 mls/hr IV .Bolus ONE


   Stop: 06/02/21 15:27


   Last Admin: 06/02/21 14:47  Dose: 1,000 mls/hr


   Documented by: HAMICAS


Sodium Chloride (Sodium Chloride 0.9% 10 Ml Syringe)  10 ml FLUSH ASDIRECTED PRN


   PRN Reason: Keep Vein Open


   Last Admin: 06/02/21 13:14  Dose: 10 ml


   Documented by: HAMICAS


Sodium Chloride (Sodium Chloride 0.9% 2.5 Ml Syringe)  2.5 ml FLUSH ASDIRECTED 

PRN


   PRN Reason: Keep Vein Open


   Last Admin: 06/02/21 13:14  Dose: 2.5 ml


   [facility]








Labs: 


                                Laboratory Tests











  06/02/21 06/02/21 06/02/21 Range/Units





  13:20 13:20 13:20 


 


WBC  11.06 H    (4.0-11.0)  K/uL


 


RBC  5.43    (4.30-5.90)  M/uL


 


Hgb  18.2 H    (12.0-16.0)  g/dL


 


Hct  51.7 H    (36.0-46.0)  %


 


MCV  95.2    (80.0-98.0)  fL


 


MCH  33.5 H    (27.0-32.0)  pg


 


MCHC  35.2    (31.0-37.0)  g/dL


 


RDW Std Deviation  52.4    (28.0-62.0)  fl


 


RDW Coeff of Lindsey  15    (11.0-15.0)  %


 


Plt Count  257    (150-400)  K/uL


 


MPV  11.60    (7.40-12.00)  fL


 


Neut % (Auto)  68.9    (48.0-80.0)  %


 


Lymph % (Auto)  10.7 L    (16.0-40.0)  %


 


Mono % (Auto)  9.5    (0.0-15.0)  %


 


Eos % (Auto)  10.5 H    (0.0-7.0)  %


 


Baso % (Auto)  0.4    (0.0-1.5)  %


 


Neut # (Auto)  7.6 H    (1.4-5.7)  K/uL


 


Lymph # (Auto)  1.2    (0.6-2.4)  K/uL


 


Mono # (Auto)  1.1 H    (0.0-0.8)  K/uL


 


Eos # (Auto)  1.2 H    (0.0-0.7)  K/uL


 


Baso # (Auto)  0.0    (0.0-0.1)  K/uL


 


Nucleated RBC %  0.0    /100WBC


 


Nucleated RBCs #  0    K/uL


 


Sodium   130 L   (136-145)  mmol/L


 


Potassium   4.4   (3.5-5.1)  mmol/L


 


Chloride   93 L   ()  mmol/L


 


Carbon Dioxide   27.9   (21.0-32.0)  mmol/L


 


BUN   13   (7.0-18.0)  mg/dL


 


Creatinine   1.0   (0.6-1.0)  mg/dL


 


Est Cr Clr Drug Dosing   54.24   mL/min


 


Estimated GFR (MDRD)   57.4   ml/min


 


Glucose   114 H   ()  mg/dL


 


Calcium   10.0   (8.5-10.1)  mg/dL


 


Total Bilirubin   0.4   (0.2-1.0)  mg/dL


 


AST   25   (15-37)  IU/L


 


ALT   34   (14-63)  IU/L


 


Alkaline Phosphatase   84   ()  U/L


 


Total Protein   9.1 H   (6.4-8.2)  g/dL


 


Albumin   3.9   (3.4-5.0)  g/dL


 


Globulin   5.2 H   (2.6-4.0)  g/dL


 


Albumin/Globulin Ratio   0.8 L   (0.9-1.6)  


 


Lipase   45 L   ()  U/L


 


Urine Color    YELLOW  


 


Urine Appearance    CLEAR  


 


Urine pH    5.5  (5.0-8.0)  


 


Ur Specific Gravity    1.010  (1.001-1.035)  


 


Urine Protein    NEGATIVE  (NEGATIVE)  mg/dL


 


Urine Glucose (UA)    NEGATIVE  (NEGATIVE)  mg/dL


 


Urine Ketones    NEGATIVE  (NEGATIVE)  mg/dL


 


Urine Occult Blood    SMALL H  (NEGATIVE)  


 


Urine Nitrite    POSITIVE H  (NEGATIVE)  


 


Urine Bilirubin    NEGATIVE  (NEGATIVE)  


 


Urine Urobilinogen    0.2  (<2.0)  EU/dL


 


Ur Leukocyte Esterase    TRACE H  (NEGATIVE)  


 


Urine RBC    0-1  (0-2/HPF)  


 


Urine WBC    5-10  (0-5/HPF)  


 


Ur Epithelial Cells    FEW  (NONE-FEW)  


 


Urine Bacteria    3+ H  (NEGATIVE)  











Meds: 


Medications











Generic Name Dose Route Start Last Admin





  Trade Name Freq  PRN Reason Stop Dose Admin


 


Sodium Chloride  1,000 mls @ 1,000 mls/hr  06/02/21 14:28  06/02/21 14:47





  Normal Saline  IV  06/02/21 15:27  1,000 mls/hr





  .Bolus ONE   Administration


 


Sodium Chloride  10 ml  06/02/21 12:54  06/02/21 13:14





  Sodium Chloride 0.9% 10 Ml Syringe  FLUSH   10 ml





  ASDIRECTED PRN   Administration





  Keep Vein Open  


 


Sodium Chloride  2.5 ml  06/02/21 12:54  06/02/21 13:14





  Sodium Chloride 0.9% 2.5 Ml Syringe  FLUSH   2.5 ml





  ASDIRECTED PRN   Administration





  Keep Vein Open  














Discontinued Medications














Generic Name Dose Route Start Last Admin





  Trade Name Freq  PRN Reason Stop Dose Admin


 


Sodium Chloride  1,000 mls @ 1,000 mls/hr  06/02/21 12:56  06/02/21 13:14





  Normal Saline  IV  06/02/21 13:55  1,000 mls/hr





  .Bolus ONE   Administration


 


Pantoprazole Sodium 40 mg/  10 mls @ 300 mls/hr  06/02/21 14:28  06/02/21 14:47





  Sodium Chloride  IV  06/02/21 14:29  300 mls/hr





  NOW ONE   Administration


 


Ondansetron HCl  4 mg  06/02/21 12:54  06/02/21 13:14





  Ondansetron 4 Mg/2 Ml Sdv  IVPUSH  06/02/21 12:55  4 mg





  ONETIME ONE   Administration














Departure





- Departure


Time of Disposition: 15:01


Disposition: Home, Self-Care 01


Condition: Good


Clinical Impression: 


 Urinary tract infection, Dehydration, Hyponatremia, Gastritis








- Discharge Information


Prescriptions: 


Nitrofurantoin Monohyd/M-Cryst [Macrobid 100 mg Capsule] 100 mg PO BID #20 

capsule


Ondansetron [Zofran ODT] 4 mg PO Q6H PRN #10 tab.dis


 PRN Reason: Nausea


Instructions:  Dehydration, Adult, Easy-to-Read, Urinary Tract Infection, Adult,

Easy-to-Read, Hyponatremia, Gastritis, Adult, Easy-to-Read


Referrals: 


Boni Cortés MD [Primary Care Provider] - 


Forms:  ED Department Discharge


Additional Instructions: 


[facility] - Primary Care


[address] [place] [id number]


Phone: [phone]


Fax: [phone]








[address] [address]


Phone: [phone]


Fax: [phone]





The following information is given to patients seen in the emergency department 

who are being discharged to home. This information is to outline your options 

for follow-up care. We provide all patients seen in our emergency department 

with a follow-up referral.





The need for follow-up, as well as the timing and circumstances, are variable 

depending upon the specifics of your emergency department visit.





If you don't have a primary care physician on staff, we will provide you with a 

referral. We always advise you to contact your personal physician following an 

emergency department visit to inform them of the circumstance of the visit and 

for follow-up with them and/or the need for any referrals to a consulting 

specialist.





The emergency department will also refer you to a specialist when appropriate. 

This referral assures that you have the opportunity for follow-up care with a 

specialist. All of these measure are taken in an effort to provide you with 

optimal care, which includes your follow-up.





Under all circumstances we always encourage you to contact your private 

physician who remains a resource for coordinating your care. When calling for 

follow-up care, please make the office aware that this follow-up is from your 

recent emergency room visit. If for any reason you are refused follow-up, please

contact the [facility] Emergency Department

at [phone] and asked to speak to the emergency department charge nurse.








Sepsis Event Note (ED)





- Focused Exam


Vital Signs: 


                                   Vital Signs











  Temp Pulse Resp BP Pulse Ox


 


 06/02/21 14:44  36.6 C  91  18  120/68  96


 


 06/02/21 12:59  36.4 C  94  16  119/77  97














- My Orders


Last 24 Hours: 


My Active Orders





06/02/21 12:54


Sodium Chloride 0.9% [Saline Flush]   10 ml FLUSH ASDIRECTED PRN 


Sodium Chloride 0.9% [Saline Flush]   2.5 ml FLUSH ASDIRECTED PRN 


Saline Lock Insert [OM.PC] Stat 





06/02/21 14:28


Sodium Chloride 0.9% [Normal Saline] 1,000 ml IV .Bolus 





06/02/21 14:43


CULTURE URINE [MREF] Stat 














- Assessment/Plan


Last 24 Hours: 


My Active Orders





06/02/21 12:54


Sodium Chloride 0.9% [Saline Flush]   10 ml FLUSH ASDIRECTED PRN 


Sodium Chloride 0.9% [Saline Flush]   2.5 ml FLUSH ASDIRECTED PRN 


Saline Lock Insert [OM.PC] Stat 





06/02/21 14:28


Sodium Chloride 0.9% [Normal Saline] 1,000 ml IV .Bolus 





06/02/21 14:43


CULTURE URINE [MREF] Stat

## 2022-11-10 ENCOUNTER — HOSPITAL ENCOUNTER (EMERGENCY)
Dept: HOSPITAL 56 - MW.ED | Age: 57
Discharge: HOME | End: 2022-11-10
Payer: COMMERCIAL

## 2022-11-10 VITALS — DIASTOLIC BLOOD PRESSURE: 73 MMHG | HEART RATE: 68 BPM | SYSTOLIC BLOOD PRESSURE: 131 MMHG
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
## 2022-11-10 DIAGNOSIS — S82.832A: Primary | ICD-10-CM

## 2022-11-10 DIAGNOSIS — Z91.013: ICD-10-CM

## 2022-11-10 DIAGNOSIS — Z88.1: ICD-10-CM

## 2022-11-10 DIAGNOSIS — T50.905A: ICD-10-CM
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
## 2022-11-10 DIAGNOSIS — Z88.5: ICD-10-CM

## 2022-11-10 DIAGNOSIS — E66.9: ICD-10-CM

## 2022-11-10 DIAGNOSIS — Z88.2: ICD-10-CM

## 2022-11-10 PROCEDURE — 73502 X-RAY EXAM HIP UNI 2-3 VIEWS: CPT

## 2022-11-10 PROCEDURE — 72072 X-RAY EXAM THORAC SPINE 3VWS: CPT

## 2022-11-10 PROCEDURE — 73110 X-RAY EXAM OF WRIST: CPT

## 2022-11-10 PROCEDURE — 29125 APPL SHORT ARM SPLINT STATIC: CPT

## 2022-11-10 PROCEDURE — 73610 X-RAY EXAM OF ANKLE: CPT

## 2022-11-10 PROCEDURE — 99283 EMERGENCY DEPT VISIT LOW MDM: CPT

## 2022-11-10 PROCEDURE — 73560 X-RAY EXAM OF KNEE 1 OR 2: CPT

## 2025-04-15 ENCOUNTER — HOSPITAL ENCOUNTER (EMERGENCY)
Dept: HOSPITAL 56 - MW.ED | Age: 60
Discharge: NURSING, REHAB OR LONG-TERM CARE | End: 2025-04-15
Payer: COMMERCIAL

## 2025-04-15 VITALS — HEART RATE: 90 BPM | SYSTOLIC BLOOD PRESSURE: 111 MMHG | DIASTOLIC BLOOD PRESSURE: 79 MMHG

## 2025-04-15 DIAGNOSIS — Z88.5: ICD-10-CM

## 2025-04-15 DIAGNOSIS — Z88.2: ICD-10-CM

## 2025-04-15 DIAGNOSIS — K56.609: ICD-10-CM

## 2025-04-15 DIAGNOSIS — Z75.8: ICD-10-CM

## 2025-04-15 DIAGNOSIS — K43.5: Primary | ICD-10-CM

## 2025-04-15 DIAGNOSIS — Z88.1: ICD-10-CM

## 2025-04-15 DIAGNOSIS — Z88.8: ICD-10-CM

## 2025-04-15 DIAGNOSIS — Z88.0: ICD-10-CM

## 2025-04-15 LAB
ALBUMIN SERPL-MCNC: 4.4 G/DL (ref 3.4–5)
ALBUMIN/GLOB SERPL: 0.9 {RATIO} (ref 0.9–1.6)
APPEARANCE UR: CLEAR
BACTERIA URNS QL MICRO: (no result)
BASOPHILS # BLD AUTO: 0.04 K/UL (ref 0–0.2)
BASOPHILS NFR BLD AUTO: 0.3 % (ref 0–1)
BILIRUB SERPL-MCNC: 0.5 MG/DL (ref 0.2–1)
BILIRUB UR STRIP-MCNC: NEGATIVE MG/DL
CALCIUM SERPL-MCNC: 10.1 MG/DL (ref 8.5–10.1)
COLOR UR: YELLOW
CREAT CL 24H UR+SERPL-VRATE: 57.4 ML/MIN
CREAT SERPL-MCNC: 0.9 MG/DL (ref 0.6–1)
EOSINOPHIL # BLD AUTO: 0.02 K/UL (ref 0–0.45)
EOSINOPHIL NFR BLD AUTO: 0.2 % (ref 0–6)
EPI CELLS #/AREA URNS HPF: (no result) /[HPF]
GLOBULIN SER-MCNC: 4.7 G/DL (ref 2.6–4)
GLUCOSE UR STRIP-MCNC: NEGATIVE MG/DL
HGB BLD-MCNC: 17.3 G/DL (ref 12–16)
IMM GRANULOCYTES # BLD: 0.03 K/UL (ref 0–0.05)
IMM GRANULOCYTES NFR BLD: 0.2 % (ref 0–0.4)
KETONES UR STRIP-MCNC: NEGATIVE MG/DL
LACTATE SERPL-SCNC: 0.8 MMOL/L (ref 0.4–2)
LYMPHOCYTES # BLD AUTO: 1.15 K/UL (ref 1–4.8)
LYMPHOCYTES NFR BLD AUTO: 8.7 % (ref 24–44)
MAGNESIUM SERPL-MCNC: 2.1 MG/DL (ref 1.8–2.4)
MCH RBC QN AUTO: 32.4 PG (ref 28–32)
MCHC RBC AUTO-ENTMCNC: 33.9 G/DL (ref 32–36)
MCHC RBC AUTO-ENTMCNC: 95.5 FL (ref 83–99)
MONOCYTES # BLD AUTO: 0.54 K/UL (ref 0–0.8)
MONOCYTES NFR BLD AUTO: 4.1 % (ref 0–8)
NEUTROPHILS # BLD AUTO: 11.47 K/UL (ref 1.8–7.7)
NEUTROPHILS NFR BLD AUTO: 86.5 % (ref 41–71)
NITRITE UR QL: NEGATIVE
PH UR STRIP: 5.5 [PH] (ref 5–8)
PLATELET # BLD AUTO: 245 K/UL (ref 150–400)
PMV BLD AUTO: 9.7 FL (ref 9.4–12.3)
PROT SERPL-MCNC: 9.1 G/DL (ref 6.4–8.2)
PROT UR STRIP-MCNC: NEGATIVE MG/DL
RBC # BLD AUTO: 5.34 M/UL (ref 4.1–5.3)
RBC # URNS HPF: (no result) /ML
RBC UR QL: (no result)
SP GR UR STRIP: <= 1.005 (ref 1–1.03)
UROBILINOGEN UR STRIP-ACNC: 0.2 EU/DL (ref ?–2)
WBC # BLD AUTO: 13.25 K/UL (ref 3.9–11.3)

## 2025-04-15 PROCEDURE — 96376 TX/PRO/DX INJ SAME DRUG ADON: CPT

## 2025-04-15 PROCEDURE — 96361 HYDRATE IV INFUSION ADD-ON: CPT

## 2025-04-15 PROCEDURE — 83690 ASSAY OF LIPASE: CPT

## 2025-04-15 PROCEDURE — 43752 NASAL/OROGASTRIC W/TUBE PLMT: CPT

## 2025-04-15 PROCEDURE — 96375 TX/PRO/DX INJ NEW DRUG ADDON: CPT

## 2025-04-15 PROCEDURE — 83605 ASSAY OF LACTIC ACID: CPT

## 2025-04-15 PROCEDURE — 83735 ASSAY OF MAGNESIUM: CPT

## 2025-04-15 PROCEDURE — 74177 CT ABD & PELVIS W/CONTRAST: CPT

## 2025-04-15 PROCEDURE — 87040 BLOOD CULTURE FOR BACTERIA: CPT

## 2025-04-15 PROCEDURE — 36415 COLL VENOUS BLD VENIPUNCTURE: CPT

## 2025-04-15 PROCEDURE — 81001 URINALYSIS AUTO W/SCOPE: CPT

## 2025-04-15 PROCEDURE — 87086 URINE CULTURE/COLONY COUNT: CPT

## 2025-04-15 PROCEDURE — 96367 TX/PROPH/DG ADDL SEQ IV INF: CPT

## 2025-04-15 PROCEDURE — 80053 COMPREHEN METABOLIC PANEL: CPT

## 2025-04-15 PROCEDURE — 85025 COMPLETE CBC W/AUTO DIFF WBC: CPT

## 2025-04-15 PROCEDURE — 96365 THER/PROPH/DIAG IV INF INIT: CPT

## 2025-04-15 PROCEDURE — 99285 EMERGENCY DEPT VISIT HI MDM: CPT

## 2025-04-15 RX ADMIN — ONDANSETRON ONE MG: 2 INJECTION, SOLUTION INTRAMUSCULAR; INTRAVENOUS at 23:26

## 2025-04-15 RX ADMIN — CIPROFLOXACIN SCH MLS/HR: 2 INJECTION, SOLUTION INTRAVENOUS at 22:34

## 2025-04-15 RX ADMIN — METRONIDAZOLE ONE MLS/HR: 500 SOLUTION INTRAVENOUS at 21:35

## 2025-04-15 RX ADMIN — IOPAMIDOL STA ML: 755 INJECTION, SOLUTION INTRAVENOUS at 18:35

## 2025-04-15 RX ADMIN — TOPICAL ANESTHETIC ONE EACH: 200 SPRAY DENTAL; PERIODONTAL at 22:03

## 2025-04-15 RX ADMIN — ONDANSETRON ONE MG: 2 INJECTION, SOLUTION INTRAMUSCULAR; INTRAVENOUS at 17:44
